# Patient Record
Sex: FEMALE | Race: WHITE | NOT HISPANIC OR LATINO | URBAN - METROPOLITAN AREA
[De-identification: names, ages, dates, MRNs, and addresses within clinical notes are randomized per-mention and may not be internally consistent; named-entity substitution may affect disease eponyms.]

---

## 2021-01-13 ENCOUNTER — OFFICE VISIT (OUTPATIENT)
Dept: URBAN - METROPOLITAN AREA CLINIC 19 | Facility: CLINIC | Age: 76
End: 2021-01-13

## 2021-01-13 RX ORDER — PHENAZOPYRIDINE 100 MG/1
2 TABLETS AFTER MEALS TABLET, FILM COATED ORAL THREE TIMES A DAY
Status: ACTIVE | COMMUNITY

## 2021-01-13 RX ORDER — DOXYCYCLINE 40 MG/1
1 CAPSULE IN THE MORNING ON AN EMPTY STOMACH CAPSULE ORAL ONCE A DAY
Status: DISCONTINUED | COMMUNITY

## 2021-01-13 RX ORDER — SULFAMETHOXAZOLE AND TRIMETHOPRIM 400; 80 MG/1; MG/1
1 TABLET TABLET ORAL ONCE A DAY
Status: DISCONTINUED | COMMUNITY

## 2021-01-13 RX ORDER — TETANUS TOXOID, REDUCED DIPHTHERIA TOXOID AND ACELLULAR PERTUSSIS VACCINE, ADSORBED 5; 2.5; 8; 8; 2.5 [IU]/.5ML; [IU]/.5ML; UG/.5ML; UG/.5ML; UG/.5ML
AS DIRECTED SUSPENSION INTRAMUSCULAR
Status: DISCONTINUED | COMMUNITY

## 2021-01-13 RX ORDER — CITALOPRAM HYDROBROMIDE 20 MG/1
1 TABLET TABLET, FILM COATED ORAL ONCE A DAY
Status: ACTIVE | COMMUNITY

## 2021-01-13 RX ORDER — NITROGLYCERIN 0.4 MG/1
AS DIRECTED TABLET SUBLINGUAL
Status: ACTIVE | COMMUNITY

## 2021-01-13 RX ORDER — GABAPENTIN 300 MG/1
1 CAPSULE CAPSULE ORAL ONCE A DAY
Status: DISCONTINUED | COMMUNITY

## 2021-01-13 RX ORDER — SIMVASTATIN 20 MG/1
1 TABLET IN THE EVENING TABLET, FILM COATED ORAL ONCE A DAY
Status: ACTIVE | COMMUNITY

## 2021-01-13 RX ORDER — METRONIDAZOLE 500 MG/1
1 TABLET TABLET, FILM COATED ORAL THREE TIMES A DAY
Status: DISCONTINUED | COMMUNITY

## 2021-11-15 ENCOUNTER — DASHBOARD ENCOUNTERS (OUTPATIENT)
Age: 76
End: 2021-11-15

## 2021-11-15 ENCOUNTER — LAB OUTSIDE AN ENCOUNTER (OUTPATIENT)
Dept: URBAN - METROPOLITAN AREA CLINIC 19 | Facility: CLINIC | Age: 76
End: 2021-11-15

## 2021-11-15 ENCOUNTER — OFFICE VISIT (OUTPATIENT)
Dept: URBAN - METROPOLITAN AREA CLINIC 19 | Facility: CLINIC | Age: 76
End: 2021-11-15
Payer: MEDICARE

## 2021-11-15 VITALS
BODY MASS INDEX: 34.16 KG/M2 | WEIGHT: 174 LBS | SYSTOLIC BLOOD PRESSURE: 126 MMHG | HEIGHT: 60 IN | TEMPERATURE: 98.1 F | DIASTOLIC BLOOD PRESSURE: 70 MMHG

## 2021-11-15 DIAGNOSIS — Z86.010 PERSONAL HISTORY OF COLONIC POLYPS: ICD-10-CM

## 2021-11-15 PROCEDURE — 99202 OFFICE O/P NEW SF 15 MIN: CPT | Performed by: NURSE PRACTITIONER

## 2021-11-15 PROCEDURE — 99242 OFF/OP CONSLTJ NEW/EST SF 20: CPT | Performed by: NURSE PRACTITIONER

## 2021-11-15 RX ORDER — CITALOPRAM HYDROBROMIDE 20 MG/1
1 TABLET TABLET, FILM COATED ORAL ONCE A DAY
Status: ACTIVE | COMMUNITY

## 2021-11-15 NOTE — HPI-TODAY'S VISIT:
The patient was referred by Dr. Dg Torres for colon cancer screening.   A copy of this document is being forwarded to the referring provider.    Ms. Serrano is a 76 year-old female who presents for a colon cancer screening. Last colonoscopy: 2017 with Dr. Shreya hickman. Has had polyps in the past. No family history of colon polyps or colon cancer. Patient denies nausea, heartburn, dysphagia, abdominal pain, rectal bleeding, unintentional weight loss, and loss of appetite. She has a bowel movement every 1-2 days, which is soft and formed. She denies pacemaker/defibrillator, diabetes, kidney disease, and home O2. Takes aspirin.

## 2021-12-27 PROBLEM — 428283002: Status: ACTIVE | Noted: 2021-11-15

## 2022-02-15 ENCOUNTER — OFFICE VISIT (OUTPATIENT)
Dept: URBAN - METROPOLITAN AREA LAB 2 | Facility: LAB | Age: 77
End: 2022-02-15

## 2022-04-07 ENCOUNTER — TELEPHONE ENCOUNTER (OUTPATIENT)
Dept: URBAN - METROPOLITAN AREA CLINIC 19 | Facility: CLINIC | Age: 77
End: 2022-04-07

## 2022-04-11 ENCOUNTER — TELEPHONE ENCOUNTER (OUTPATIENT)
Dept: URBAN - METROPOLITAN AREA CLINIC 6 | Facility: CLINIC | Age: 77
End: 2022-04-11

## 2022-04-12 ENCOUNTER — OFFICE VISIT (OUTPATIENT)
Dept: URBAN - METROPOLITAN AREA LAB 2 | Facility: LAB | Age: 77
End: 2022-04-12